# Patient Record
Sex: FEMALE | Race: BLACK OR AFRICAN AMERICAN
[De-identification: names, ages, dates, MRNs, and addresses within clinical notes are randomized per-mention and may not be internally consistent; named-entity substitution may affect disease eponyms.]

---

## 2019-08-03 ENCOUNTER — HOSPITAL ENCOUNTER (EMERGENCY)
Dept: HOSPITAL 97 - ER | Age: 29
Discharge: HOME | End: 2019-08-03
Payer: SELF-PAY

## 2019-08-03 DIAGNOSIS — M43.6: Primary | ICD-10-CM

## 2019-08-03 PROCEDURE — 87070 CULTURE OTHR SPECIMN AEROBIC: CPT

## 2019-08-03 PROCEDURE — 96372 THER/PROPH/DIAG INJ SC/IM: CPT

## 2019-08-03 PROCEDURE — 99283 EMERGENCY DEPT VISIT LOW MDM: CPT

## 2019-08-03 PROCEDURE — 87081 CULTURE SCREEN ONLY: CPT

## 2019-08-03 NOTE — EDPHYS
Physician Documentation                                                                           

 [facility]                                                                 

Name: Xena Torres                                                                                 

Age: 28 yrs                                                                                       

Sex: Female                                                                                       

: 1990                                                                                   

MRN: [id number]                                                                                   

Arrival Date: 2019                                                                          

Time: 09:30                                                                                       

Account#: [id number]                                                                            

Bed 17                                                                                            

Private MD: Chivo Acevedo ED Physician Jj Barrett                                                                         

HPI:                                                                                              

                                                                                             

10:08 This 28 yrs old Black Female presents to ER via Ambulatory with complaints of Sore      snw 

      Throat.                                                                                     

10:08 The patient presents with sore throat. The patient describes throat pain as painful     snw 

      swallowing. Onset: The symptoms/episode began/occurred suddenly. Severity of symptoms:      

      At their worst the symptoms were moderate. Associated signs and symptoms: Pertinent         

      positives: flu-like symptoms. The patient has not experienced similar symptoms in the       

      past. The patient has not recently seen a physician.                                        

                                                                                                  

Historical:                                                                                       

- Allergies:                                                                                      

09:37 No Known Allergies;                                                                     em  

- Home Meds:                                                                                      

09:37 None [Active];                                                                          em  

- PMHx:                                                                                           

09:37 None;                                                                                   em  

- PSHx:                                                                                           

09:37 None;                                                                                   em  

                                                                                                  

- Immunization history:: Flu vaccine is not up to date.                                           

- Social history:: Smoking status: Patient/guardian denies using tobacco.                         

- Ebola Screening: : Patient negative for fever greater than or equal to 101.5 degrees            

  Fahrenheit, and additional compatible Ebola Virus Disease symptoms Patient denies               

  exposure to infectious person Patient denies travel to an Ebola-affected area in the            

  21 days before illness onset No symptoms or risks identified at this time.                      

                                                                                                  

                                                                                                  

ROS:                                                                                              

10:06 Constitutional: Negative for fever, chills, and weight loss, Eyes: Negative for injury, snw 

      pain, redness, and discharge.                                                               

10:06 Neck: Negative for injury, pain, and swelling, Cardiovascular: Negative for chest pain,     

      palpitations, and edema, Respiratory: Negative for shortness of breath, cough,              

      wheezing, and pleuritic chest pain, Abdomen/GI: Negative for abdominal pain, nausea,        

      vomiting, diarrhea, and constipation, Back: Negative for injury and pain, : Negative      

      for injury, bleeding, discharge, and swelling, MS/Extremity: Negative for injury and        

      deformity, + upper back, shoulder pain Skin: Negative for injury, rash, and                 

      discoloration, Neuro: Negative for headache, weakness, numbness, tingling, and seizure,     

      Psych: Negative for depression, anxiety, suicide ideation, homicidal ideation, and          

      hallucinations.                                                                             

10:06 ENT: Positive for sore throat.                                                              

                                                                                                  

Exam:                                                                                             

10:06 Head/Face:  Normocephalic, atraumatic. Eyes:  Pupils equal round and reactive to light, snw 

      extra-ocular motions intact.  Lids and lashes normal.  Conjunctiva and sclera are           

      non-icteric and not injected.  Cornea within normal limits.  Periorbital areas with no      

      swelling, redness, or edema.                                                                

10:06 Chest/axilla:  Normal chest wall appearance and motion.  Nontender with no deformity.       

      No lesions are appreciated. Cardiovascular:  Regular rate and rhythm with a normal S1       

      and S2.  No gallops, murmurs, or rubs.  Normal PMI, no JVD.  No pulse deficits.             

      Respiratory:  Lungs have equal breath sounds bilaterally, clear to auscultation and         

      percussion.  No rales, rhonchi or wheezes noted.  No increased work of breathing, no        

      retractions or nasal flaring. Abdomen/GI:  Soft, non-tender, with normal bowel sounds.      

      No distension or tympany.  No guarding or rebound.  No evidence of tenderness               

      throughout. Back:  No spinal tenderness.  No costovertebral tenderness.  Full range of      

      motion. Skin:  Warm, dry with normal turgor.  Normal color with no rashes, no lesions,      

      and no evidence of cellulitis. MS/ Extremity:  Pulses equal, no cyanosis.                   

      Neurovascular intact.  Full, normal range of motion. Neuro:  Awake and alert, GCS 15,       

      oriented to person, place, time, and situation.  Cranial nerves II-XII grossly intact.      

      Motor strength 5/5 in all extremities.  Sensory grossly intact.  Cerebellar exam            

      normal.  Normal gait. Psych:  Awake, alert, with orientation to person, place and time.     

       Behavior, mood, and affect are within normal limits.                                       

10:06 Constitutional: The patient appears alert, awake, uncomfortable.                            

10:06 ENT: External ear(s): are unremarkable, Ear canal(s): are normal, TM's: are normal,         

      Nose: is normal, Mouth: is normal, Posterior pharynx: no acute changes, Voice: is           

      normal.                                                                                     

10:06 Neck: External neck: tenderness, that is moderate, holding head erect, resists movement snw 

      in any direction, + muscle spasm..                                                          

11:06 Neck: External neck: improved range of motion. Encouraged hot showers, gentle range of  snw 

      motion.                                                                                     

                                                                                                  

Vital Signs:                                                                                      

09:37  / 89; Pulse 88; Resp 18; Temp 98.5; Pulse Ox 100% on R/A; Weight 63.5 kg; Height em  

      5 ft. 2 in. (157.48 cm); Pain 8/10;                                                         

11:07  / 75; Pulse 66; Resp 16; Pulse Ox 100% on R/A; Pain 3/10;                        em  

09:37 Body Mass Index 25.61 (63.50 kg, 157.48 cm)                                             em  

                                                                                                  

MDM:                                                                                              

09:33 Patient medically screened.                                                             snw 

10:59 Data reviewed: vital signs, nurses notes. Data interpreted: Pulse oximetry: on room air snw 

      is 100 %. Interpretation: normal. Counseling: I had a detailed discussion with the          

      patient and/or guardian regarding: the historical points, exam findings, and any            

      diagnostic results supporting the discharge/admit diagnosis, the presence of at least       

      one elevated blood pressure reading (>120/80) during this emergency department visit,       

      the need for outpatient follow up. Response to treatment: the patient's symptoms have       

      markedly improved after treatment. Special discussion: I have referred the patient to       

      see his PCP for further evaluation of high blood pressure. Based on the history and         

      exam findings, there is no indication for further emergent testing or inpatient             

      evaluation. I discussed with the patient/guardian the need to see the primary care          

      provider for further evaluation of the symptoms. ED course: encouraged to RTED              

      immediately for fever, vomiting, worsening symptoms, concerns.                              

                                                                                                  

                                                                                             

09:32 Order name: Strep; Complete Time: 10:06                                                 snw 

                                                                                             

09:59 Order name: Throat Culture                                                              EDMS

                                                                                                  

Administered Medications:                                                                         

10:22 Drug: ZyrTEC - Cetirizine 10 mg Route: PO;                                              iw  

11:08 Follow up: Response: No adverse reaction; Pain is decreased                             em  

10:22 Drug: Flexeril 10 mg Route: PO;                                                         iw  

11:08 Follow up: Response: No adverse reaction; Pain is decreased                             em  

10:23 Drug: TORadol 30 mg Route: IM; Site: right deltoid;                                     iw  

11:08 Follow up: Response: No adverse reaction; Pain is decreased                             em  

                                                                                                  

                                                                                                  

Disposition:                                                                                      

11:27 Co-signature as Attending Physician, Jj Barrett MD.                                    rn  

                                                                                                  

Disposition:                                                                                      

19 10:55 Discharged to Home. Impression: Torticollis.                                       

- Condition is Stable.                                                                            

- Discharge Instructions: Acute Torticollis, Adult, Rehydration, Adult, Heat Therapy,             

  Neck Exercises.                                                                                 

- Prescriptions for Cyclobenzaprine 10 mg Oral Tablet - take 1 tablet by ORAL route               

  every 8 hours As needed; 30 tablet. Diclofenac Sodium 75 mg Oral Tablet Sustained               

  Release - take 1 tablet by ORAL route 2 times per day; 30 tablet.                               

- Medication Reconciliation Form, Thank You Letter, Antibiotic Education, Prescription            

  Opioid Use form.                                                                                

- Follow up: Private Physician; When: 1 - 2 days; Reason: Recheck today's complaints,             

  Continuance of care, Re-evaluation by your physician. Follow up: Emergency                      

  Department; When: As needed; Reason: Worsening of condition.                                    

                                                                                                  

                                                                                                  

                                                                                                  

Signatures:                                                                                       

Dispatcher [employer]                           EDSara Trejo FNP-C                 [id number]                                                  

Jay Boyce, LVN                       LVN  em                                                   

Caitlin Lanza, HENNA                     RN   iw                                                   

Jj Barrett MD MD   rn                                                   

                                                                                                  

Corrections: (The following items were deleted from the chart)                                    

11:08 10:55 2019 10:55 Discharged to Home. Impression: Torticollis. Condition is        em  

      Stable. Forms are Medication Reconciliation Form, Thank You Letter, Antibiotic              

      Education, Prescription Opioid Use. Follow up: Private Physician; When: 1 - 2 days;         

      Reason: Recheck today's complaints, Continuance of care, Re-evaluation by your              

      physician. Follow up: Emergency Department; When: As needed; Reason: Worsening of           

      condition. snw                                                                              

                                                                                                  

**************************************************************************************************

## 2020-10-13 NOTE — ER
Nurse's Notes                                                                                     

 [facility]                                                                 

Name: Xena Torres                                                                                 

Age: 28 yrs                                                                                       

Sex: Female                                                                                       

: 1990                                                                                   

MRN: [id number]                                                                                   

Arrival Date: 2019                                                                          

Time: 09:30                                                                                       

Account#: [id number]                                                                            

Bed 17                                                                                            

Private MD: Chivo Acevedo                                                                        

Diagnosis: Torticollis                                                                            

                                                                                                  

Presentation:                                                                                     

                                                                                             

09:36 Presenting complaint: Patient states: sore throat since yesterday, denies cough, nausea em  

      or fever. Transition of care: patient was not received from another setting of care.        

      Onset of symptoms was 2019. Risk Assessment: Do you want to hurt yourself or     

      someone else? Patient reports no desire to harm self or others. Initial Sepsis Screen:      

      Does the patient meet any 2 criteria? No. Patient's initial sepsis screen is negative.      

      Does the patient have a suspected source of infection? No. Patient's initial sepsis         

      screen is negative. Care prior to arrival: None.                                            

09:36 Method Of Arrival: Ambulatory                                                           em  

09:39 Acuity: ULISES 4                                                                           iw  

                                                                                                  

Historical:                                                                                       

- Allergies:                                                                                      

09:37 No Known Allergies;                                                                     em  

- Home Meds:                                                                                      

09:37 None [Active];                                                                          em  

- PMHx:                                                                                           

09:37 None;                                                                                   em  

- PSHx:                                                                                           

09:37 None;                                                                                   em  

                                                                                                  

- Immunization history:: Flu vaccine is not up to date.                                           

- Social history:: Smoking status: Patient/guardian denies using tobacco.                         

- Ebola Screening: : Patient negative for fever greater than or equal to 101.5 degrees            

  Fahrenheit, and additional compatible Ebola Virus Disease symptoms Patient denies               

  exposure to infectious person Patient denies travel to an Ebola-affected area in the            

  21 days before illness onset No symptoms or risks identified at this time.                      

                                                                                                  

                                                                                                  

Screenin:38 Abuse screen: Denies threats or abuse. Nutritional screening: No deficits noted.        em  

      Tuberculosis screening: No symptoms or risk factors identified. Fall Risk None              

      identified.                                                                                 

                                                                                                  

Assessment:                                                                                       

09:37 General: Appears in no apparent distress. uncomfortable, Behavior is calm, cooperative, em  

      Denies fever. Pain: Complains of pain in neck Pain currently is 8 out of 10 on a pain       

      scale. Pain began 1 day ago. Neuro: Level of Consciousness is awake, alert, obeys           

      commands, Oriented to person, place, time, situation, Reports difficulty swallowing         

      Denies headache. Cardiovascular: Heart tones S1 S2 present Capillary refill < 3 seconds     

      Patient's skin is warm and dry. Respiratory: Airway is patent Respiratory effort is         

      even, unlabored, Breath sounds are clear bilaterally. Denies cough. GI: Abdomen is          

      flat, Patient currently denies nausea, vomiting. EENT: Oral mucosa is moist. Throat is      

      clear is pink has enlarged tonsils bilaterally Denies nasal congestion, nasal               

      discharge. Derm: Skin is intact, is healthy with good turgor, Skin is pink, warm \T\ dry.   

      Musculoskeletal: Capillary refill < 3 seconds, Range of motion: intact in all               

      extremities.                                                                                

11:07 Reassessment: Patient appears in no apparent distress at this time. Patient and/or      em  

      family updated on plan of care and expected duration. Pain level reassessed. Patient is     

      alert, oriented x 3, equal unlabored respirations, skin warm/dry/pink. rates pain 3/10      

      Patient states feeling better. Patient states symptoms have improved.                       

                                                                                                  

Vital Signs:                                                                                      

09:37  / 89; Pulse 88; Resp 18; Temp 98.5; Pulse Ox 100% on R/A; Weight 63.5 kg; Height em  

      5 ft. 2 in. (157.48 cm); Pain 8/10;                                                         

11:07  / 75; Pulse 66; Resp 16; Pulse Ox 100% on R/A; Pain 3/10;                        em  

09:37 Body Mass Index 25.61 (63.50 kg, 157.48 cm)                                             em  

                                                                                                  

ED Course:                                                                                        

09:30 Patient arrived in ED.                                                                  mr  

09:31 Chivo Acevedo MD is Private Physician.                                                mr  

09:32 [facility]                                                        snw 

09:32 Jj Barrett MD is Attending Physician.                                                snw 

09:32 Jay Boyce LVN is Primary Nurse.                                                     em  

09:37 Arm band placed on.                                                                     em  

09:38 Patient has correct armband on for positive identification. Bed in low position. Call   em  

      light in reach.                                                                             

09:39 Triage completed.                                                                       iw  

11:07 No provider procedures requiring assistance completed. Patient did not have IV access   em  

      during this emergency room visit.                                                           

                                                                                                  

Administered Medications:                                                                         

10:22 Drug: ZyrTEC - Cetirizine 10 mg Route: PO;                                              iw  

11:08 Follow up: Response: No adverse reaction; Pain is decreased                             em  

10:22 Drug: Flexeril 10 mg Route: PO;                                                         iw  

11:08 Follow up: Response: No adverse reaction; Pain is decreased                             em  

10:23 Drug: TORadol 30 mg Route: IM; Site: right deltoid;                                     iw  

11:08 Follow up: Response: No adverse reaction; Pain is decreased                             em  

                                                                                                  

                                                                                                  

Outcome:                                                                                          

10:55 Discharge ordered by MD. chen 

11:07 Discharged to home ambulatory, with family.                                             em  

11:07 Condition: good                                                                             

11:07 Discharge instructions given to patient, family, Instructed on discharge instructions,      

      follow up and referral plans. medication usage, Demonstrated understanding of               

      instructions, follow-up care, medications, Prescriptions given X 2.                         

11:08 Patient left the ED.                                                                    em  

                                                                                                  

Signatures:                                                                                       

Sara Aquino, FNP-C                 FNP-Csnw                                                  

Quin Andrade Edgar, LVN                       LVN  em                                                   

Caitlin Lanza, RN                     RN   iw                                                   

                                                                                                  

************************************************************************************************** Subjective:       Patient ID: Grace Beltran is a 56 y.o. female.    Chief Complaint: Groin pain    HPI:  Grace Beltran is a 56 y.o. female with bilateral groin pain diagnosed with hip arthritis and had surgery for labral tear on right in 2014 or so and had resurfacing of bone.  Later developed left hip pain.  Dr. Ana Lackey diagnosed OA and nothing to do.   Swimming and movement of both hips.  Tylenol helps.  Pain is on average 3-4/10 ache.   Injections helped first time but not second time.   PT helped.  Not doing exercises      Endocrinology Note:     She was diagnosed with osteoporosis by bone density scan in 7/2019. Patient admits to history of fracture of R patella after a fall in 10/2018, R ankle fracture after fall in 2016.  Treated by endo with Reclast in 10/2019.     Family hx of Osteoporosis, mother got diagnosed at 52, now 77. Hx of fracture in first-degree relative: yes - mom, shoulder fracture, at 76   Grandmother also had osteoporosis. Currently taking Tums dailys, not taking supplement like Vit D at this time. Not on any other treatment     Health wise is stable  Menopause, ~51 yo of age. Not on estrogen therapy  Follow up with dentist, no jaw/dental issue     R hip surgery to fix labral tear in 2012     No acid reflux  No renal stone  No hematologic cancer: no Multiple myeloma hx  Want better alternative fosamax. As she suspect poor compliance.  Patient complains of osteoporosis. She was diagnosed with osteoporosis by bone density scan in 7/2019. Patient admits to history of fracture of R patella after a fall in 10/2018, R ankle fracture after fall in 2016.     Family hx of Osteoporosis, mother got diagnosed at 52, now 77.  Grandmother also had osteoporosis. Currently taking Tums dailys, not taking supplement like Vit D at this time. Not on any other treatment     Health wise is stable  Menopause, ~51 yo of age. Not on estrogen therapy  Follow up with dentist, no jaw/dental issue     R  "hip surgery to fix labral tear in 2012     No acid reflux  No renal stone  No hematologic cancer: no Multiple myeloma hx  Want better alternative fosamax. As she suspect poor compliance    Review of Systems   Constitutional: Negative for fever and unexpected weight change.   HENT: Negative for mouth sores and trouble swallowing.    Eyes: Negative for redness.   Respiratory: Negative for cough and shortness of breath.    Cardiovascular: Negative for chest pain.   Gastrointestinal: Negative for constipation and diarrhea.   Genitourinary: Negative for dysuria and genital sores.   Musculoskeletal: Positive for arthralgias.   Skin: Negative for rash.   Neurological: Negative for headaches.   Hematological: Does not bruise/bleed easily.         Objective:   /84 (BP Location: Right arm, Patient Position: Sitting, BP Method: Medium (Automatic))   Pulse 71   Temp 98.2 °F (36.8 °C) (Oral)   Ht 5' 4" (1.626 m)   Wt 72.1 kg (158 lb 15.2 oz)   LMP 11/29/2016 (Approximate)   BMI 27.28 kg/m²      Physical Exam   Constitutional: She is oriented to person, place, and time and well-developed, well-nourished, and in no distress.   HENT:   Head: Normocephalic and atraumatic.   Eyes: Conjunctivae are normal.   Neck: Neck supple.   Cardiovascular: Normal rate, regular rhythm and normal heart sounds.    Pulmonary/Chest: Effort normal and breath sounds normal.   Abdominal: Soft. Bowel sounds are normal.   Neurological: She is alert and oriented to person, place, and time. Gait normal.   Psychiatric: Mood and affect normal.   Musculoskeletal:      Comments: Pain on internal and external rotation of right hip  FROM of left hip            Assessment:       1.  Bilateral hip pain  2.  Osteoporosis  3.  Overweight    Plan:       1. Tylenol not to exceed 3,000 mg daily  2. Glucosamine and chondroitin trial  3. Restart exercises from PT  4. Follow with endocrine  5. NOOM    RTO 6 months/prn    "